# Patient Record
Sex: MALE | Race: WHITE | NOT HISPANIC OR LATINO | URBAN - METROPOLITAN AREA
[De-identification: names, ages, dates, MRNs, and addresses within clinical notes are randomized per-mention and may not be internally consistent; named-entity substitution may affect disease eponyms.]

---

## 2021-07-05 ENCOUNTER — EMERGENCY (EMERGENCY)
Facility: HOSPITAL | Age: 51
LOS: 0 days | Discharge: HOME | End: 2021-07-05
Attending: EMERGENCY MEDICINE
Payer: COMMERCIAL

## 2021-07-05 VITALS
TEMPERATURE: 98 F | RESPIRATION RATE: 18 BRPM | DIASTOLIC BLOOD PRESSURE: 73 MMHG | SYSTOLIC BLOOD PRESSURE: 140 MMHG | HEIGHT: 74 IN | WEIGHT: 300.05 LBS | HEART RATE: 120 BPM | OXYGEN SATURATION: 98 %

## 2021-07-05 VITALS
OXYGEN SATURATION: 100 % | HEART RATE: 110 BPM | RESPIRATION RATE: 18 BRPM | TEMPERATURE: 98 F | DIASTOLIC BLOOD PRESSURE: 83 MMHG | SYSTOLIC BLOOD PRESSURE: 143 MMHG

## 2021-07-05 DIAGNOSIS — S81.832A PUNCTURE WOUND WITHOUT FOREIGN BODY, LEFT LOWER LEG, INITIAL ENCOUNTER: ICD-10-CM

## 2021-07-05 DIAGNOSIS — I83.892 VARICOSE VEINS OF LEFT LOWER EXTREMITY WITH OTHER COMPLICATIONS: ICD-10-CM
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
## 2021-07-05 DIAGNOSIS — R00.0 TACHYCARDIA, UNSPECIFIED: ICD-10-CM
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
## 2021-07-05 PROCEDURE — 99283 EMERGENCY DEPT VISIT LOW MDM: CPT

## 2021-07-05 PROCEDURE — 99284 EMERGENCY DEPT VISIT MOD MDM: CPT

## 2021-07-05 NOTE — ED PROVIDER NOTE - OBJECTIVE STATEMENT
laceration 52 y/o M p/w bleeding from a varicose vein that he scratched on his left anterior lower leg.  Pt applied a dressing at home and came to the ED because it was initially bleeding moderately.  Pt denies any other complaints.

## 2021-07-05 NOTE — ED ADULT NURSE NOTE - OBJECTIVE STATEMENT
52 y/o a&0x4 male comes into the ED with Patient was at a party and felt as if he got a bug bite on his leg when he looked down he saw blood dripping from his left shin.  according to EMS "Laceration to left shine they applied pressure dressing, bleeding is controlled at this time, pt states he is not in pain at this time

## 2021-07-05 NOTE — ED PROVIDER NOTE - PATIENT PORTAL LINK FT
You can access the FollowMyHealth Patient Portal offered by [facility] by registering at the following website: [email or web address]. By joining [employer]’s FollowMyHealth portal, you will also be able to view your health information using other applications (apps) compatible with our system.

## 2021-07-05 NOTE — ED ADULT TRIAGE NOTE - CHIEF COMPLAINT QUOTE
Patient was at a party and felt blood dripping down his leg.  Patient has laceration to the left shin.

## 2022-01-12 ENCOUNTER — FORM ENCOUNTER (OUTPATIENT)
Age: 52
End: 2022-01-12

## 2022-01-13 PROBLEM — Z00.00 ENCOUNTER FOR PREVENTIVE HEALTH EXAMINATION: Status: ACTIVE | Noted: 2022-01-13

## 2022-01-14 ENCOUNTER — NON-APPOINTMENT (OUTPATIENT)
Age: 52
End: 2022-01-14

## 2022-08-16 NOTE — ED ADULT NURSE NOTE - CAS TRG GENERAL AIRWAY, MLM
Preoperative Diagnosis:  Prostate Cancer  Postoperative Diagnosis:  Prostate Cancer     Procedure:    1. Robotic Radical Prostatectomy with left pelvic lymphadenectomy                                        Surgeon: Marcus Bhatia MD  Assistant: Catalino Gallego MD  Assistant: Bhavani Rogers PA-C  Date of Procedure: 08/16/22    OPERATIVE INDICATION: Walter Santa is a 59 year old male with prostate cancer.  After understanding various management options he elected to undergo today's procedure.     Clinical stage: [id number]  PSA: 15  ALPESH: nodule left mid to apex  MRI findings:  n/a   Prostate volume:   EPE:   SVI:   Urethral length:   LNI:  Biopsy grade: 4+4=8  # cores positive: 4  # cores total: 13    Intraoperative findings:    Urethra: good length   Right nerve sparing:complete   Left nerve sparing: partial resection   Bladder neck sparing:partial   Bladder neck reconstruction: anterior   Anastomosis tested:watertight   Lymphadenopathy: none   Accessory obturator artery/size/spared: none      PORT PLACEMENT:  Standard 6 ports                After discussing all treatment options, the patient elected to proceed with robotic prostatectomy.  The patient understands that we will proceed with robotic prostatectomy, but will convert to open prostatectomy if needed.     DESCRIPTION OF PROCEDURE:     The patient was identified and was brought to the operating room.  He was placed on a Gelfoam padded table.  After adequate general anesthesia, he was placed in supine position.  Pneumatic compression stockings had been applied.  All pressure points were adequately padded.  Arms were tucked and he was secured to the table.  The patient was then prepped and draped in the usual manner.  Time out was called.  An 18 [place] catheter was placed and the bladder was emptied.  I made a small supraumbilical incision.  Using a Veress needle, pneumoperitoneum was achieved.  I then placed an 8 mm port at this site.   Initial endoscopic inspection with a 0 degree lens showed no evidence of vascular or visceral injury.  There were a few LLQ adhesions. The remaining ports were then placed.  Two 8 mm ports were placed on either side 10 cm from the umbilical port.  A 12 mm port was placed in the right lower quadrant above the anterior superior iliac spine, and a similar location on the left side an 8 mm port was placed.  A 5 mm suction port was placed lateral to the camera port on the right side.  With all the ports in place the patient was placed in steep Trendelenburg position and the robot was docked.     The sigmoid was gently released from the LLQ and the posterior bladder at the cul-de-sac.  I then incised the peritoneum at the bladder base and the posterior dissection was carried beneath Denonvillier's fascia to the prostate apex.  The seminal vesicles and vas deferens were identified and dissected free.  Next peritoneal incisions were made lateral to the medial umbilical ligaments and the bladder was dissected away from the anterior abdominal wall and pubic ramus to expose the prostate.  The superficial dorsal vein was cauterized and transected.  The endopelvic fascia was opened.  Incision was made in the lateral prostatic fascia and nerve-sparing was started in the interfascial plane.     Next the anterior bladder wall was incised at the level of the bladder neck. The posterior bladder neck was then excised and dissected away from the base of the prostate.  The seminal vesicles and vas deferens were now tented up.  This exposed the lateral pedicles.       I then proceeded to identify the plane between the lateral prostate and the lateral pedicles. Hem-O-Theodore clips were used and the lateral pedicle was transected.  Electrocautery was not used in this area.  Complete right nerve preservation was performed and partial left nerve preservation.  Due to high grade disease and palpable nodule, the plane of dissection was slightly  wider at the left posterior mid and apex.  The dissection was carried around the apex of the prostate.  Next I transected the deep dorsal vein and then oversewed this with a running 3-0 V-kierra suture. The urethra was transected distal to the apex of the prostate and the prostate was placed in a Endocatch. Excellent urethral length was preserved.      A left pelvic lymph node dissection was done, removing fibrofatty tissue in the external iliac and obturator areas. Bipolar cautery and hemolock clips were used to ligate lymphatic vessels.  The obturator nerve was identified and preserved.       I then proceeded to anastomose the bladder neck to the urethra using a running suture of 3-0 Monocryl on a double armed needle.  An 18 [place] catheter was placed and the bladder irrigated well.  A good watertight, tension free anastasmosis was obtained.  Further inspection at this time showed no further bleeding.  Vistaseal was used to achieve further hemostasis. The string of the Endocatch was then brought out through the umbilical port site.   The robot was then undocked.  The laparoscopic ports were removed under vision and the 12 mm assistant port was closed with an 0 Vicryl using a Rashad Thomasen device  I slightly extended the supraumbilical incision and the prostate was removed through this site.  The fascia was then closed using running 0 Vicryl sutures.  The skin incisions were closed using 4-0 monocryl and Dermabond.  The needle, sponge and lap counts were correct.  The patient remained stable throughout the entire procedure.  The estimated blood loss was: 100 ml. The patient tolerated the procedure well and was sent to the recovery room in stable condition.     Catheter can be removed in 10 days   Patent